# Patient Record
Sex: FEMALE | Race: WHITE | NOT HISPANIC OR LATINO | Employment: UNEMPLOYED | URBAN - METROPOLITAN AREA
[De-identification: names, ages, dates, MRNs, and addresses within clinical notes are randomized per-mention and may not be internally consistent; named-entity substitution may affect disease eponyms.]

---

## 2021-05-26 ENCOUNTER — TELEPHONE (OUTPATIENT)
Dept: OBSTETRICS AND GYNECOLOGY | Facility: CLINIC | Age: 41
End: 2021-05-26

## 2021-05-26 RX ORDER — ETONOGESTREL AND ETHINYL ESTRADIOL VAGINAL RING .015; .12 MG/D; MG/D
1 RING VAGINAL
Qty: 3 EACH | Refills: 0 | Status: SHIPPED | OUTPATIENT
Start: 2021-05-26 | End: 2021-08-05

## 2021-08-05 RX ORDER — ETONOGESTREL AND ETHINYL ESTRADIOL VAGINAL RING .015; .12 MG/D; MG/D
RING VAGINAL
Qty: 1 EACH | Refills: 1 | Status: SHIPPED | OUTPATIENT
Start: 2021-08-05 | End: 2021-09-21 | Stop reason: SDUPTHER

## 2021-09-21 ENCOUNTER — TELEPHONE (OUTPATIENT)
Dept: OBSTETRICS AND GYNECOLOGY | Facility: CLINIC | Age: 41
End: 2021-09-21

## 2021-09-21 RX ORDER — ETONOGESTREL AND ETHINYL ESTRADIOL VAGINAL RING .015; .12 MG/D; MG/D
1 RING VAGINAL
Qty: 3 EACH | Refills: 0 | Status: SHIPPED | OUTPATIENT
Start: 2021-09-21 | End: 2021-10-27 | Stop reason: SDUPTHER

## 2021-10-27 ENCOUNTER — HOSPITAL ENCOUNTER (OUTPATIENT)
Dept: RADIOLOGY | Facility: CLINIC | Age: 41
Discharge: HOME | End: 2021-10-27
Attending: NURSE PRACTITIONER
Payer: COMMERCIAL

## 2021-10-27 ENCOUNTER — OFFICE VISIT (OUTPATIENT)
Dept: OBSTETRICS AND GYNECOLOGY | Facility: CLINIC | Age: 41
End: 2021-10-27
Payer: COMMERCIAL

## 2021-10-27 VITALS
HEIGHT: 62 IN | BODY MASS INDEX: 39.2 KG/M2 | WEIGHT: 213 LBS | DIASTOLIC BLOOD PRESSURE: 80 MMHG | SYSTOLIC BLOOD PRESSURE: 118 MMHG

## 2021-10-27 DIAGNOSIS — Z12.31 ENCOUNTER FOR SCREENING MAMMOGRAM FOR BREAST CANCER: ICD-10-CM

## 2021-10-27 DIAGNOSIS — Z12.31 ENCOUNTER FOR SCREENING MAMMOGRAM FOR BREAST CANCER: Primary | ICD-10-CM

## 2021-10-27 DIAGNOSIS — Z01.419 ENCOUNTER FOR WELL WOMAN EXAM WITH ROUTINE GYNECOLOGICAL EXAM: ICD-10-CM

## 2021-10-27 DIAGNOSIS — N94.10 DYSPAREUNIA IN FEMALE: ICD-10-CM

## 2021-10-27 PROBLEM — E53.8 VITAMIN B12 DEFICIENCY: Status: ACTIVE | Noted: 2021-10-27

## 2021-10-27 PROBLEM — J30.9 ALLERGIC RHINITIS: Status: ACTIVE | Noted: 2021-10-27

## 2021-10-27 PROBLEM — F41.8 MIXED ANXIETY AND DEPRESSIVE DISORDER: Status: ACTIVE | Noted: 2021-10-27

## 2021-10-27 PROBLEM — K21.9 GERD WITHOUT ESOPHAGITIS: Status: ACTIVE | Noted: 2021-10-27

## 2021-10-27 PROBLEM — D50.9 IRON DEFICIENCY ANEMIA: Status: ACTIVE | Noted: 2021-10-27

## 2021-10-27 PROCEDURE — 99396 PR PREVENTIVE VISIT,EST,40-64: ICD-10-PCS | Mod: S$GLB,,, | Performed by: NURSE PRACTITIONER

## 2021-10-27 PROCEDURE — 77067 SCR MAMMO BI INCL CAD: CPT | Mod: S$GLB,,, | Performed by: RADIOLOGY

## 2021-10-27 PROCEDURE — 99396 PREV VISIT EST AGE 40-64: CPT | Mod: S$GLB,,, | Performed by: NURSE PRACTITIONER

## 2021-10-27 PROCEDURE — 77063 BREAST TOMOSYNTHESIS BI: CPT | Mod: S$GLB,,, | Performed by: RADIOLOGY

## 2021-10-27 PROCEDURE — 77067 MAMMO DIGITAL SCREENING BILAT WITH TOMO: ICD-10-PCS | Mod: S$GLB,,, | Performed by: RADIOLOGY

## 2021-10-27 PROCEDURE — 77063 MAMMO DIGITAL SCREENING BILAT WITH TOMO: ICD-10-PCS | Mod: S$GLB,,, | Performed by: RADIOLOGY

## 2021-10-27 RX ORDER — ZOLPIDEM TARTRATE 5 MG/1
5 TABLET ORAL NIGHTLY PRN
COMMUNITY
Start: 2021-10-25

## 2021-10-27 RX ORDER — BUPROPION HYDROCHLORIDE 150 MG/1
150 TABLET ORAL EVERY MORNING
COMMUNITY
Start: 2021-07-21

## 2021-10-27 RX ORDER — MONTELUKAST SODIUM 10 MG/1
10 TABLET ORAL DAILY
COMMUNITY
Start: 2021-10-25

## 2021-10-27 RX ORDER — PAROXETINE HYDROCHLORIDE 20 MG/1
20 TABLET, FILM COATED ORAL DAILY
COMMUNITY
Start: 2021-07-21

## 2021-10-27 RX ORDER — PANTOPRAZOLE SODIUM 40 MG/1
40 TABLET, DELAYED RELEASE ORAL DAILY
COMMUNITY
Start: 2021-07-21 | End: 2023-11-08

## 2021-10-27 RX ORDER — DEXTROAMPHETAMINE SULFATE, DEXTROAMPHETAMINE SACCHARATE, AMPHETAMINE SULFATE AND AMPHETAMINE ASPARTATE 5; 5; 5; 5 MG/1; MG/1; MG/1; MG/1
CAPSULE, EXTENDED RELEASE ORAL EVERY MORNING
COMMUNITY
Start: 2021-10-25

## 2021-10-27 RX ORDER — BISOPROLOL FUMARATE AND HYDROCHLOROTHIAZIDE 5; 6.25 MG/1; MG/1
TABLET ORAL
COMMUNITY
Start: 2021-07-21 | End: 2023-11-08

## 2021-10-27 RX ORDER — ETONOGESTREL AND ETHINYL ESTRADIOL VAGINAL RING .015; .12 MG/D; MG/D
1 RING VAGINAL
Qty: 3 EACH | Refills: 4 | Status: SHIPPED | OUTPATIENT
Start: 2021-10-27 | End: 2022-03-02

## 2022-01-19 ENCOUNTER — OFFICE VISIT (OUTPATIENT)
Dept: OBSTETRICS AND GYNECOLOGY | Facility: CLINIC | Age: 42
End: 2022-01-19
Payer: COMMERCIAL

## 2022-01-19 VITALS
SYSTOLIC BLOOD PRESSURE: 124 MMHG | DIASTOLIC BLOOD PRESSURE: 82 MMHG | WEIGHT: 210.81 LBS | HEIGHT: 62 IN | BODY MASS INDEX: 38.79 KG/M2

## 2022-01-19 DIAGNOSIS — R10.2 PELVIC PAIN: ICD-10-CM

## 2022-01-19 DIAGNOSIS — N94.10 DYSPAREUNIA IN FEMALE: Primary | ICD-10-CM

## 2022-01-19 DIAGNOSIS — N94.19 DYSPAREUNIA DUE TO MEDICAL CONDITION IN FEMALE: ICD-10-CM

## 2022-01-19 PROCEDURE — 99214 OFFICE O/P EST MOD 30 MIN: CPT | Mod: S$GLB,,, | Performed by: OBSTETRICS & GYNECOLOGY

## 2022-01-19 PROCEDURE — 99214 PR OFFICE/OUTPT VISIT, EST, LEVL IV, 30-39 MIN: ICD-10-PCS | Mod: S$GLB,,, | Performed by: OBSTETRICS & GYNECOLOGY

## 2022-01-19 NOTE — PROGRESS NOTES
Laila Nieves is a 41 y.o.  who presents today for pre-op visit .  She has been followed in our clinic with complaints of pelvic pain and dyspareunia for the past year and she states that it feels like the dyspareunia has to do with uterus pain during and after intercourse.  Her ultrasound 1 year ago did not show any fibroids but did show some uterine enlargement.  And she has minimal uterine prolapse.  We discussed that her pain is probably associated with bumping into the uterus during intercourse and the uterine enlargement which causes pelvic pain.  We discussed probability that hysterectomy would relieve that pain but there would be a possible chance that it might not relieve that pain.  Her dyspareunia does not appear to be due to vaginal pain.  It does appear to be due to uterus cramping pain.  We discussed endometriosis and her ovaries and she request to save her ovaries if possible    C/C:   Chief Complaint   Patient presents with    Pre-op Exam     Pt here for pre-op. RALH/BS scheduled 2022.       HPI: Patient [place] [facility] [place].  The procedure to be performed is RALH/BS.     MENSTRUAL HISTORY  No LMP recorded. (Menstrual status: Birth Control)..      PAP HISTORY: last pap ,  denies any history of abnormal pap smear.      Review of patient's allergies indicates:   Allergen Reactions    Codeine Itching    Sulfamethoxazole-trimethoprim Hives     Past Medical History:   Diagnosis Date    ADHD     Anxiety     Depression     GERD (gastroesophageal reflux disease)     HTN (hypertension)      Past Surgical History:   Procedure Laterality Date    ABDOMINOPLASTY      BREAST RECONSTRUCTION      breast lift    REMOVAL OF BREAST IMPLANT      TONSILLECTOMY, ADENOIDECTOMY       Past Surgical History:   Procedure Laterality Date    ABDOMINOPLASTY      BREAST RECONSTRUCTION      breast lift    REMOVAL OF BREAST IMPLANT      TONSILLECTOMY, ADENOIDECTOMY       OB  "History    Para Term  AB Living   2 2 2 0 0 2   SAB IAB Ectopic Multiple Live Births   0 0 0 0 2      # Outcome Date GA Lbr Zak/2nd Weight Sex Delivery Anes PTL Lv   2 Term 05    M Vag-Spont EPI N CHANO   1 Term /    M Vag-Spont EPI N CHANO     OB History        2    Para   2    Term   2       0    AB   0    Living   2       SAB   0    IAB   0    Ectopic   0    Multiple   0    Live Births   2               Family History   Problem Relation Age of Onset    Hyperlipidemia Father     Hypertension Father     Heart attack Father     Kidney cancer Father     Diabetes Father     Abnormal EKG Paternal Grandfather     Colon cancer Paternal Grandfather     Lung cancer Maternal Grandmother     Abnormal EKG Maternal Uncle 50    Colon cancer Maternal Uncle     Breast cancer Paternal Uncle      Social History     Substance and Sexual Activity   Sexual Activity Yes    Partners: Male    Comment: Nuva Ring       Regina Cárdenas MD          ROS  Review of Systems    OBJECTIVE:  /82   Ht 5' 2" (1.575 m)   Wt 95.6 kg (210 lb 12.8 oz)   BMI 38.56 kg/m²   Physical Exam   Constitutional/Gen: Constitutional/Gen: NAD, appears stated age  Breast: deferred  External genitalia: no lesions or discharge, normal hair distribution  Vagina: normal appearance, no lesions, no discharge, no evidence cystocele or rectocele.  Cervix: normal appearance, no discharge, no lesions, negative CMT  Uterus:  mobile, normal size, contour, and position. No significant pathologic descent.  Adnexa: no masses or tenderness  Neurologic: A&O x 4  Psych: affect appropriate and without signs of mood, thought or memory difficulty appreciated    A:  Pelvic pain with dyspareunia, probably related to enlarged uterus and uterine pain and contractions during and after intercourse.    41 y.o. female  for pre-op visit  Body mass index is 38.56 kg/m².  Patient Active Problem List   Diagnosis    Allergic rhinitis "    Attention deficit disorder of adult    GERD without esophagitis    Iron deficiency anemia    Mixed anxiety and depressive disorder    Terry's metatarsalgia, neuralgia, or neuroma    Vitamin B12 deficiency         P:  The patient request robotic assisted laparoscopic hysterectomy with removal of both tubes.  Will save ovaries if possible.  Will remove endometriosis from the ovaries if that is seen at the time of surgery.  She understands risk, alternatives, and indications for that procedure.  There are no diagnoses linked to this encounter.      No follow-ups on file.

## 2022-01-24 ENCOUNTER — TELEPHONE (OUTPATIENT)
Dept: OBSTETRICS AND GYNECOLOGY | Facility: CLINIC | Age: 42
End: 2022-01-24
Payer: COMMERCIAL

## 2022-01-24 NOTE — TELEPHONE ENCOUNTER
Pt tested positive for Covid on 1/21/22. Surgery is being rescheduled, Pt aware of the new date and time. Per Nury, at least 7 days after the date she tested positive.  Pt also notified to contact our office for any new symptoms she might experience other than the SOB she is currently having. Pt verbalized understanding.

## 2022-02-14 ENCOUNTER — TELEPHONE (OUTPATIENT)
Dept: OBSTETRICS AND GYNECOLOGY | Facility: CLINIC | Age: 42
End: 2022-02-14
Payer: COMMERCIAL

## 2022-02-14 NOTE — TELEPHONE ENCOUNTER
----- Message from Natalie Cuevas sent at 2/14/2022  1:34 PM CST -----  Contact: pt  Type: Needs Medical Advice    Who Called: pt    Best Call Back Number: [phone]    Inquiry/Question: pt has questions regarding hysterectomy scheduled tomorrow.   Please call to advise     Thank you~

## 2022-02-15 ENCOUNTER — OUTSIDE PLACE OF SERVICE (OUTPATIENT)
Dept: OBSTETRICS AND GYNECOLOGY | Facility: CLINIC | Age: 42
End: 2022-02-15

## 2022-02-15 PROCEDURE — 58571 TLH W/T/O 250 G OR LESS: CPT | Mod: ,,, | Performed by: OBSTETRICS & GYNECOLOGY

## 2022-02-15 PROCEDURE — 58571 PR LAPAROSCOPY W TOT HYSTERECTUTERUS <=250 GRAM  W TUBE/OVARY: ICD-10-PCS | Mod: ,,, | Performed by: OBSTETRICS & GYNECOLOGY

## 2022-03-02 ENCOUNTER — OFFICE VISIT (OUTPATIENT)
Dept: OBSTETRICS AND GYNECOLOGY | Facility: CLINIC | Age: 42
End: 2022-03-02
Payer: COMMERCIAL

## 2022-03-02 VITALS
BODY MASS INDEX: 39.63 KG/M2 | SYSTOLIC BLOOD PRESSURE: 134 MMHG | HEIGHT: 62 IN | WEIGHT: 215.38 LBS | DIASTOLIC BLOOD PRESSURE: 86 MMHG

## 2022-03-02 DIAGNOSIS — Z09 POSTOP CHECK: Primary | ICD-10-CM

## 2022-03-02 DIAGNOSIS — N92.1 MENORRHAGIA WITH IRREGULAR CYCLE: ICD-10-CM

## 2022-03-02 PROBLEM — N92.0 MENORRHAGIA: Status: ACTIVE | Noted: 2022-03-02

## 2022-03-02 PROCEDURE — 99024 PR POST-OP FOLLOW-UP VISIT: ICD-10-PCS | Mod: S$GLB,,, | Performed by: OBSTETRICS & GYNECOLOGY

## 2022-03-02 PROCEDURE — 99024 POSTOP FOLLOW-UP VISIT: CPT | Mod: S$GLB,,, | Performed by: OBSTETRICS & GYNECOLOGY

## 2022-03-02 NOTE — PROGRESS NOTES
History of Present Illness:   Pateint presents today  2 weeks postop, status post RALH/BS, with complaint of spotting.    Pathology:  We discussed pathology with the patient and it showed benign uterus and tubes with no significant other abnormalities.    Past medical and surgical history reviewed.   I have reviewed the patient's medical history in detail and updated the computerized patient record.    Physical exam:  Vital Signs: as above, reviewed.  Constitutional: She is oriented to person, place, and time. She appears well-developed and well-nourished. No distress.   HENT:   Head: Normocephalic and atraumatic.   Eyes: Conjunctivae and EOM are normal. No scleral icterus.   Neck: Normal range of motion. Neck supple. No tracheal deviation present.   Cardiovascular: Normal rate.    Pulmonary/Chest: Effort normal. No respiratory distress. She exhibits no tenderness.  Breasts: deferred  Abdominal: Soft. She exhibits no distension and no mass.  The incisions are healing well. There is no rebound and no guarding.   Genitourinary: Deferred  Musculoskeletal: Normal range of motion.   Lymphadenopathy: No inguinal adenopathy present.   Neurological: She is alert and oriented to person, place, and time. Coordination normal.   Skin: Skin is warm and dry. She is not diaphoretic.   Psychiatric: She has a normal mood and affect.    Assessment:  Normal postop recovery after robotic assisted laparoscopic hysterectomy and bilateral salpingectomy.    Plan:  Needs routine wellness exams in the future.  Follow up  1 year

## 2023-11-08 ENCOUNTER — OFFICE VISIT (OUTPATIENT)
Dept: OBSTETRICS AND GYNECOLOGY | Facility: CLINIC | Age: 43
End: 2023-11-08
Payer: COMMERCIAL

## 2023-11-08 VITALS
HEIGHT: 62 IN | DIASTOLIC BLOOD PRESSURE: 84 MMHG | SYSTOLIC BLOOD PRESSURE: 123 MMHG | WEIGHT: 155.19 LBS | BODY MASS INDEX: 28.56 KG/M2

## 2023-11-08 DIAGNOSIS — Z01.419 ENCOUNTER FOR WELL WOMAN EXAM WITH ROUTINE GYNECOLOGICAL EXAM: Primary | ICD-10-CM

## 2023-11-08 PROCEDURE — 99396 PREV VISIT EST AGE 40-64: CPT | Mod: S$GLB,,, | Performed by: NURSE PRACTITIONER

## 2023-11-08 PROCEDURE — 99396 PR PREVENTIVE VISIT,EST,40-64: ICD-10-PCS | Mod: S$GLB,,, | Performed by: NURSE PRACTITIONER

## 2023-11-08 RX ORDER — DEXTROAMPHETAMINE SACCHARATE, AMPHETAMINE ASPARTATE MONOHYDRATE, DEXTROAMPHETAMINE SULFATE AND AMPHETAMINE SULFATE 7.5; 7.5; 7.5; 7.5 MG/1; MG/1; MG/1; MG/1
CAPSULE, EXTENDED RELEASE ORAL
COMMUNITY
Start: 2023-10-24

## 2023-11-08 NOTE — PROGRESS NOTES
CC: Well woman exam    Laila Nieves is a 43 y.o. female  presents for well woman exam.  LMP: No LMP recorded. Patient has had a hysterectomy..  Patients last pap was 10/2021.  Last wellness labs were done 10/2023.  Last mammogram was 2023.  Primary care is Dr. Phillips.  SBE yes  No issues, problems, or complaints.  Pt had gastric sleeve this year.    Chief Complaint   Patient presents with    Well Woman        Past Medical History:   Diagnosis Date    ADHD     Anxiety     Depression     GERD (gastroesophageal reflux disease)     HTN (hypertension)      Past Surgical History:   Procedure Laterality Date    ABDOMINAL SURGERY      -Abdominoplasty, -Verticle Sleeve Gastrectomy(VSG)    ABDOMINOPLASTY      APPENDECTOMY      BREAST RECONSTRUCTION      breast lift    HYSTERECTOMY      REMOVAL OF BREAST IMPLANT      TONSILLECTOMY, ADENOIDECTOMY       Social History     Socioeconomic History    Marital status:    Tobacco Use    Smoking status: Former     Current packs/day: 1.00     Average packs/day: 1 pack/day for 10.0 years (10.0 ttl pk-yrs)     Types: Cigarettes    Smokeless tobacco: Never   Substance and Sexual Activity    Alcohol use: Not Currently    Drug use: Never    Sexual activity: Yes     Partners: Male     Birth control/protection: None     Comment: Nuva Ring     Family History   Problem Relation Age of Onset    Hyperlipidemia Father     Hypertension Father     Heart attack Father     Kidney cancer Father     Diabetes Father     Heart failure Father     Abnormal EKG Paternal Grandfather     Colon cancer Paternal Grandfather     Lung cancer Maternal Grandmother     Abnormal EKG Maternal Uncle 50    Colon cancer Maternal Uncle     Breast cancer Paternal Uncle     Hyperlipidemia Mother     Hypertension Mother      OB History          2    Para   2    Term   2       0    AB   0    Living   2         SAB   0    IAB   0    Ectopic   0    Multiple   0    Live Births   2            "      /84   Ht 5' 2" (1.575 m)   Wt 70.4 kg (155 lb 3.2 oz)   BMI 28.39 kg/m²       ROS:  GENERAL: Denies weight gain or weight loss. Feeling well overall.   SKIN: Denies rash or lesions.   HEAD: Denies head injury or headache.   NODES: Denies enlarged lymph nodes.   CHEST: Denies chest pain or shortness of breath.   CARDIOVASCULAR: Denies palpitations or left sided chest pain.   ABDOMEN: No abdominal pain, constipation, diarrhea, nausea, vomiting or rectal bleeding.   URINARY: No frequency, dysuria, hematuria, or burning on urination.  REPRODUCTIVE: See HPI.   BREASTS: The patient performs breast self-examination and denies pain, lumps, or nipple discharge.   HEMATOLOGIC: No easy bruisability or excessive bleeding.   MUSCULOSKELETAL: Denies joint pain or swelling.   NEUROLOGIC: Denies syncope or weakness.   PSYCHIATRIC: Denies depression, anxiety or mood swings.    PHYSICAL EXAM:  APPEARANCE: Well nourished, well developed, in no acute distress.  AFFECT: WNL, alert and oriented x 3  SKIN: No acne or hirsutism  NECK: Neck symmetric without masses or thyromegaly  NODES: No inguinal, cervical, or axillary lymph node enlargement  CHEST: Good respiratory effect  ABDOMEN: Soft.  No tenderness or masses.  No hepatosplenomegaly.  No hernias.  BREASTS: Symmetrical, no skin changes or visible lesions.  No palpable masses, nipple discharge bilaterally.  [place] scar bilat  PELVIC: Normal external genitalia without lesions.  Normal hair distribution.  Normal urethral meatus.  Vagina without lesions or discharge.  Cervix and uterus absent.  No significant cystocele or rectocele.  Adnexa without masses or tenderness.    EXTREMITIES: No edema.    Encounter for well woman exam with routine gynecological exam      No problems.      Patient was counseled today on A.C.S. Pap guidelines and recommendations for yearly pelvic exams, mammograms and monthly self breast exams; to see her PCP for other health maintenance.     Follow " up in about 1 year (around 11/8/2024).